# Patient Record
Sex: MALE | Race: BLACK OR AFRICAN AMERICAN | Employment: FULL TIME | URBAN - METROPOLITAN AREA
[De-identification: names, ages, dates, MRNs, and addresses within clinical notes are randomized per-mention and may not be internally consistent; named-entity substitution may affect disease eponyms.]

---

## 2020-04-17 ENCOUNTER — APPOINTMENT (OUTPATIENT)
Dept: CT IMAGING | Age: 65
DRG: 101 | End: 2020-04-17
Attending: EMERGENCY MEDICINE
Payer: COMMERCIAL

## 2020-04-17 ENCOUNTER — HOSPITAL ENCOUNTER (INPATIENT)
Age: 65
LOS: 1 days | Discharge: HOME | DRG: 101 | End: 2020-04-19
Attending: EMERGENCY MEDICINE | Admitting: INTERNAL MEDICINE
Payer: COMMERCIAL

## 2020-04-17 DIAGNOSIS — R56.9 NEW ONSET SEIZURE (HCC): Primary | ICD-10-CM

## 2020-04-17 DIAGNOSIS — R56.9 SEIZURE (HCC): ICD-10-CM

## 2020-04-17 DIAGNOSIS — E03.9 ACQUIRED HYPOTHYROIDISM: Chronic | ICD-10-CM

## 2020-04-17 DIAGNOSIS — I10 ESSENTIAL HYPERTENSION: Chronic | ICD-10-CM

## 2020-04-17 DIAGNOSIS — G40.209 COMPLEX PARTIAL SEIZURE EVOLVING TO GENERALIZED SEIZURE (HCC): ICD-10-CM

## 2020-04-17 LAB
ALBUMIN SERPL-MCNC: 3.8 G/DL (ref 3.2–4.6)
ALBUMIN/GLOB SERPL: 1 {RATIO} (ref 1.2–3.5)
ALP SERPL-CCNC: 85 U/L (ref 50–136)
ALT SERPL-CCNC: 44 U/L (ref 12–65)
ANION GAP SERPL CALC-SCNC: 9 MMOL/L (ref 7–16)
AST SERPL-CCNC: 46 U/L (ref 15–37)
BASOPHILS # BLD: 0 K/UL (ref 0–0.2)
BASOPHILS NFR BLD: 0 % (ref 0–2)
BILIRUB SERPL-MCNC: 0.7 MG/DL (ref 0.2–1.1)
BUN SERPL-MCNC: 18 MG/DL (ref 8–23)
CALCIUM SERPL-MCNC: 8.7 MG/DL (ref 8.3–10.4)
CHLORIDE SERPL-SCNC: 105 MMOL/L (ref 98–107)
CO2 SERPL-SCNC: 27 MMOL/L (ref 21–32)
CREAT SERPL-MCNC: 1.29 MG/DL (ref 0.8–1.5)
DIFFERENTIAL METHOD BLD: ABNORMAL
EOSINOPHIL # BLD: 0.1 K/UL (ref 0–0.8)
EOSINOPHIL NFR BLD: 1 % (ref 0.5–7.8)
ERYTHROCYTE [DISTWIDTH] IN BLOOD BY AUTOMATED COUNT: 13.7 % (ref 11.9–14.6)
GLOBULIN SER CALC-MCNC: 3.9 G/DL (ref 2.3–3.5)
GLUCOSE SERPL-MCNC: 157 MG/DL (ref 65–100)
HCT VFR BLD AUTO: 45.1 % (ref 41.1–50.3)
HGB BLD-MCNC: 14.3 G/DL (ref 13.6–17.2)
IMM GRANULOCYTES # BLD AUTO: 0 K/UL (ref 0–0.5)
IMM GRANULOCYTES NFR BLD AUTO: 0 % (ref 0–5)
LYMPHOCYTES # BLD: 1.2 K/UL (ref 0.5–4.6)
LYMPHOCYTES NFR BLD: 18 % (ref 13–44)
MAGNESIUM SERPL-MCNC: 2.2 MG/DL (ref 1.8–2.4)
MCH RBC QN AUTO: 26 PG (ref 26.1–32.9)
MCHC RBC AUTO-ENTMCNC: 31.7 G/DL (ref 31.4–35)
MCV RBC AUTO: 82 FL (ref 79.6–97.8)
MONOCYTES # BLD: 0.3 K/UL (ref 0.1–1.3)
MONOCYTES NFR BLD: 5 % (ref 4–12)
NEUTS SEG # BLD: 4.9 K/UL (ref 1.7–8.2)
NEUTS SEG NFR BLD: 75 % (ref 43–78)
NRBC # BLD: 0 K/UL (ref 0–0.2)
PLATELET # BLD AUTO: 189 K/UL (ref 150–450)
PMV BLD AUTO: 11.4 FL (ref 9.4–12.3)
POTASSIUM SERPL-SCNC: 4.3 MMOL/L (ref 3.5–5.1)
PROT SERPL-MCNC: 7.7 G/DL (ref 6.3–8.2)
RBC # BLD AUTO: 5.5 M/UL (ref 4.23–5.6)
SODIUM SERPL-SCNC: 141 MMOL/L (ref 136–145)
WBC # BLD AUTO: 6.5 K/UL (ref 4.3–11.1)

## 2020-04-17 PROCEDURE — 99285 EMERGENCY DEPT VISIT HI MDM: CPT

## 2020-04-17 PROCEDURE — 80053 COMPREHEN METABOLIC PANEL: CPT

## 2020-04-17 PROCEDURE — 70450 CT HEAD/BRAIN W/O DYE: CPT

## 2020-04-17 PROCEDURE — 74011250636 HC RX REV CODE- 250/636: Performed by: EMERGENCY MEDICINE

## 2020-04-17 PROCEDURE — 83735 ASSAY OF MAGNESIUM: CPT

## 2020-04-17 PROCEDURE — 96365 THER/PROPH/DIAG IV INF INIT: CPT

## 2020-04-17 PROCEDURE — 93005 ELECTROCARDIOGRAM TRACING: CPT | Performed by: INTERNAL MEDICINE

## 2020-04-17 PROCEDURE — 74011000258 HC RX REV CODE- 258: Performed by: EMERGENCY MEDICINE

## 2020-04-17 PROCEDURE — 85025 COMPLETE CBC W/AUTO DIFF WBC: CPT

## 2020-04-17 RX ADMIN — LEVETIRACETAM 1000 MG: 500 INJECTION, SOLUTION INTRAVENOUS at 22:45

## 2020-04-18 ENCOUNTER — APPOINTMENT (OUTPATIENT)
Dept: MRI IMAGING | Age: 65
DRG: 101 | End: 2020-04-18
Attending: NURSE PRACTITIONER
Payer: COMMERCIAL

## 2020-04-18 PROBLEM — E03.9 ACQUIRED HYPOTHYROIDISM: Chronic | Status: ACTIVE | Noted: 2020-04-18

## 2020-04-18 PROBLEM — R56.9 SEIZURE (HCC): Status: ACTIVE | Noted: 2020-04-18

## 2020-04-18 PROBLEM — I10 HYPERTENSION: Chronic | Status: ACTIVE | Noted: 2020-04-18

## 2020-04-18 PROBLEM — E78.5 HYPERLIPIDEMIA: Chronic | Status: ACTIVE | Noted: 2020-04-18

## 2020-04-18 LAB
AMPHET UR QL SCN: NEGATIVE
ATRIAL RATE: 89 BPM
BARBITURATES UR QL SCN: NEGATIVE
BENZODIAZ UR QL: POSITIVE
CALCULATED P AXIS, ECG09: 63 DEGREES
CALCULATED R AXIS, ECG10: -24 DEGREES
CALCULATED T AXIS, ECG11: 65 DEGREES
CANNABINOIDS UR QL SCN: NEGATIVE
COCAINE UR QL SCN: NEGATIVE
DIAGNOSIS, 93000: NORMAL
METHADONE UR QL: NEGATIVE
OPIATES UR QL: NEGATIVE
P-R INTERVAL, ECG05: 142 MS
PCP UR QL: NEGATIVE
Q-T INTERVAL, ECG07: 332 MS
QRS DURATION, ECG06: 78 MS
QTC CALCULATION (BEZET), ECG08: 399 MS
VENTRICULAR RATE, ECG03: 87 BPM

## 2020-04-18 PROCEDURE — 74011250636 HC RX REV CODE- 250/636: Performed by: INTERNAL MEDICINE

## 2020-04-18 PROCEDURE — A9575 INJ GADOTERATE MEGLUMI 0.1ML: HCPCS | Performed by: INTERNAL MEDICINE

## 2020-04-18 PROCEDURE — 65270000029 HC RM PRIVATE

## 2020-04-18 PROCEDURE — 80307 DRUG TEST PRSMV CHEM ANLYZR: CPT

## 2020-04-18 PROCEDURE — 99232 SBSQ HOSP IP/OBS MODERATE 35: CPT | Performed by: NURSE PRACTITIONER

## 2020-04-18 PROCEDURE — 74011250637 HC RX REV CODE- 250/637: Performed by: INTERNAL MEDICINE

## 2020-04-18 PROCEDURE — 70553 MRI BRAIN STEM W/O & W/DYE: CPT

## 2020-04-18 RX ORDER — ROSUVASTATIN CALCIUM 20 MG/1
40 TABLET, COATED ORAL
Status: DISCONTINUED | OUTPATIENT
Start: 2020-04-18 | End: 2020-04-19 | Stop reason: HOSPADM

## 2020-04-18 RX ORDER — NALOXONE HYDROCHLORIDE 0.4 MG/ML
0.4 INJECTION, SOLUTION INTRAMUSCULAR; INTRAVENOUS; SUBCUTANEOUS AS NEEDED
Status: DISCONTINUED | OUTPATIENT
Start: 2020-04-18 | End: 2020-04-19 | Stop reason: HOSPADM

## 2020-04-18 RX ORDER — HYDRALAZINE HYDROCHLORIDE 20 MG/ML
10 INJECTION INTRAMUSCULAR; INTRAVENOUS
Status: DISCONTINUED | OUTPATIENT
Start: 2020-04-18 | End: 2020-04-19 | Stop reason: HOSPADM

## 2020-04-18 RX ORDER — SODIUM CHLORIDE 0.9 % (FLUSH) 0.9 %
10 SYRINGE (ML) INJECTION
Status: COMPLETED | OUTPATIENT
Start: 2020-04-18 | End: 2020-04-18

## 2020-04-18 RX ORDER — SODIUM CHLORIDE 0.9 % (FLUSH) 0.9 %
5-40 SYRINGE (ML) INJECTION AS NEEDED
Status: DISCONTINUED | OUTPATIENT
Start: 2020-04-18 | End: 2020-04-19 | Stop reason: HOSPADM

## 2020-04-18 RX ORDER — ONDANSETRON 2 MG/ML
4 INJECTION INTRAMUSCULAR; INTRAVENOUS
Status: DISCONTINUED | OUTPATIENT
Start: 2020-04-18 | End: 2020-04-19 | Stop reason: HOSPADM

## 2020-04-18 RX ORDER — LEVOTHYROXINE SODIUM 50 UG/1
50 TABLET ORAL
Status: DISCONTINUED | OUTPATIENT
Start: 2020-04-18 | End: 2020-04-19 | Stop reason: HOSPADM

## 2020-04-18 RX ORDER — LEVETIRACETAM 500 MG/1
1000 TABLET ORAL EVERY 12 HOURS
Status: DISCONTINUED | OUTPATIENT
Start: 2020-04-18 | End: 2020-04-19 | Stop reason: HOSPADM

## 2020-04-18 RX ORDER — ACETAMINOPHEN 325 MG/1
650 TABLET ORAL
Status: DISCONTINUED | OUTPATIENT
Start: 2020-04-18 | End: 2020-04-19 | Stop reason: HOSPADM

## 2020-04-18 RX ORDER — AMLODIPINE BESYLATE 10 MG/1
10 TABLET ORAL DAILY
Status: DISCONTINUED | OUTPATIENT
Start: 2020-04-18 | End: 2020-04-19 | Stop reason: HOSPADM

## 2020-04-18 RX ORDER — SODIUM CHLORIDE 0.9 % (FLUSH) 0.9 %
5-40 SYRINGE (ML) INJECTION EVERY 8 HOURS
Status: DISCONTINUED | OUTPATIENT
Start: 2020-04-18 | End: 2020-04-19 | Stop reason: HOSPADM

## 2020-04-18 RX ORDER — LORAZEPAM 2 MG/ML
1 INJECTION INTRAMUSCULAR
Status: DISCONTINUED | OUTPATIENT
Start: 2020-04-18 | End: 2020-04-19 | Stop reason: HOSPADM

## 2020-04-18 RX ORDER — HYDROCODONE BITARTRATE AND ACETAMINOPHEN 5; 325 MG/1; MG/1
1 TABLET ORAL
Status: DISCONTINUED | OUTPATIENT
Start: 2020-04-18 | End: 2020-04-19 | Stop reason: HOSPADM

## 2020-04-18 RX ORDER — GADOTERATE MEGLUMINE 376.9 MG/ML
19 INJECTION INTRAVENOUS
Status: COMPLETED | OUTPATIENT
Start: 2020-04-18 | End: 2020-04-18

## 2020-04-18 RX ADMIN — Medication 10 ML: at 17:46

## 2020-04-18 RX ADMIN — Medication 5 ML: at 06:22

## 2020-04-18 RX ADMIN — ACETAMINOPHEN 650 MG: 325 TABLET, FILM COATED ORAL at 10:04

## 2020-04-18 RX ADMIN — ROSUVASTATIN CALCIUM 40 MG: 20 TABLET, COATED ORAL at 20:28

## 2020-04-18 RX ADMIN — LEVETIRACETAM 1000 MG: 500 TABLET ORAL at 20:28

## 2020-04-18 RX ADMIN — GADOTERATE MEGLUMINE 19 ML: 376.9 INJECTION INTRAVENOUS at 11:14

## 2020-04-18 RX ADMIN — LEVOTHYROXINE SODIUM 50 MCG: 0.05 TABLET ORAL at 06:22

## 2020-04-18 RX ADMIN — Medication 10 ML: at 11:13

## 2020-04-18 RX ADMIN — ROSUVASTATIN CALCIUM 40 MG: 20 TABLET, COATED ORAL at 03:38

## 2020-04-18 RX ADMIN — LEVETIRACETAM 1000 MG: 500 TABLET ORAL at 10:02

## 2020-04-18 RX ADMIN — Medication 10 ML: at 20:28

## 2020-04-18 RX ADMIN — AMLODIPINE BESYLATE 10 MG: 10 TABLET ORAL at 10:04

## 2020-04-18 RX ADMIN — Medication 5 ML: at 03:55

## 2020-04-18 NOTE — PROGRESS NOTES
04/18/20 0300   Dual Skin Pressure Injury Assessment   Dual Skin Pressure Injury Assessment X   Second Care Provider (Based on [address] [place] [address]) Jenna James RN    Skin Integumentary   Skin Integumentary (WDL) X   Skin Color Appropriate for ethnicity   Skin Condition/Temp Warm;Dry   Skin Integrity Other (comment); Abrasion  (small abrasion above ear )   Turgor Non-tenting   Hair Growth Present   Varicosities Absent   Wound Prevention and Protection Methods   Orientation of Wound Prevention Posterior   Location of Wound Prevention Sacrum/Coccyx   Dressing Present  No   Wound Offloading (Prevention Methods) Bed, pressure reduction mattress;Pillows

## 2020-04-18 NOTE — CONSULTS
Consult    Patient: Gloria Hay MRN: [id number]     YOB: 1955  Age: 59 y.o. Sex: male      Subjective: Gloria Hay is a 59 y.o. male who is being seen for new onset seizure. PMH significant for HTN, HLP, hypothyroidism. Patient is poor historian. Stated he recalls eating dinner with wife, and then next thing he remembers being in the ED. Spoke with his wife, [facility] by telephone. His wife states that they were eating dinner and she noted that he began to stare off into space with facial twitching, then started to experience spontaneous rhythmic twitching of bilateral feet. Described his as being \"straight as an arrow\". She lowered him to the floor, and he then began to snore. Lasted approximately 1-2 minutes. Upon awakening, he was combative and disoriented. Denies loss of bowel or bladder. Patient denies history seizure, ETOH use, trauma, or recent illness. He received Versed by EMS prior to arrival to ED. He was noted to be more alert on arrival. Laceration noted to left side of tongue. Keppra 1000 mg IV was administered. CT head negative. No past medical history on file. No past surgical history on file. No family history on file.   Social History     Tobacco Use    Smoking status: Never Smoker   Substance Use Topics    Alcohol use: Never     Frequency: Never      Current Facility-Administered Medications   Medication Dose Route Frequency Provider Last Rate Last Dose    sodium chloride (NS) flush 5-40 mL  5-40 mL IntraVENous Q8H Alanis Alvarez MD   5 mL at 04/18/20 0622    sodium chloride (NS) flush 5-40 mL  5-40 mL IntraVENous PRN Roby Alvarez MD        acetaminophen (TYLENOL) tablet 650 mg  650 mg Oral Q6H PRN Roby Alvarez MD   650 mg at 04/18/20 1004    HYDROcodone-acetaminophen (NORCO) 5-325 mg per tablet 1 Tab  1 Tab Oral Q4H PRN Roby Alvarez MD        naloxone (NARCAN) injection 0.4 mg  0.4 mg IntraVENous PRN Nitesh Shea MD        ondansetron [facility]) injection 4 mg  4 mg IntraVENous Q4H PRN Elvia Alvarez MD        amLODIPine (NORVASC) tablet 10 mg  10 mg Oral DAILY Elvia Alvarez MD   10 mg at 04/18/20 1004    levothyroxine (SYNTHROID) tablet 50 mcg  50 mcg Oral ACB Elvia Alvarez MD   50 mcg at 04/18/20 4198    rosuvastatin (CRESTOR) tablet 40 mg  40 mg Oral QHS Elvia Alvarez MD   40 mg at 04/18/20 0562    hydrALAZINE (APRESOLINE) 20 mg/mL injection 10 mg  10 mg IntraVENous Q6H PRN Elvia Alvarez MD        LORazepam (ATIVAN) injection 1 mg  1 mg IntraVENous Q4H PRN Elvia Alvarez MD        levETIRAcetam (KEPPRA) tablet 1,000 mg  1,000 mg Oral Q12H Nitesh Shea MD   1,000 mg at 04/18/20 1002        No Known Allergies    Review of Systems:  CONSTITUTIONAL:  Denies weight loss, fever, chills, weakness or fatigue. HEENT:  Eyes:  Denies visual loss, blurred vision, double vision or yellow sclerae. Ears, Nose, Throat:  Denies hearing loss, sneezing, congestion, runny nose or sore throat. SKIN:  Denies rash or itching. CARDIOVASCULAR:  Denies chest pain, chest pressure or chest discomfort. No palpitations or edema. RESPIRATORY:  Denies shortness of breath, cough or sputum. GASTROINTESTINAL:  Denies anorexia, nausea, vomiting or diarrhea. No abdominal pain or blood. GENITOURINARY:  Denies burning with urination. NEUROLOGICAL:  Denies headache, dizziness, syncope, paralysis, ataxia, numbness or tingling in the extremities. Denies change in bowel or bladder control. MUSCULOSKELETAL:  Denies muscle, back pain, joint pain or stiffness. HEMATOLOGIC:  Denies anemia, bleeding or bruising. LYMPHATICS:  Denies enlarged nodes. PSYCHIATRIC: Denies history depression or anxiety. ENDOCRINOLOGIC:  Denies reports of sweating, cold or heat intolerance. No polyuria or polydipsia.   ALLERGIES:  Denies history of asthma, hives, eczema or rhinitis. Objective:     Vitals:    04/18/20 0240 04/18/20 0310 04/18/20 0748 04/18/20 1223   BP: 131/61 149/81 158/90 148/69   Pulse: 65 63 62 63   Resp: 12 14 16 18   Temp:  98.2 °F (36.8 °C) 97.5 °F (36.4 °C) 98 °F (36.7 °C)   SpO2: 100% 98% 99% 100%   Weight:       Height:            Physical Exam:  General - Well developed, well nourished, in no apparent distress. Pleasant and conversent. HEENT - Normocephalic, atraumatic. Conjunctiva, tympanic membranes, and oropharynx are clear. Tongue laceration on left side. Neck - Supple without masses, no bruits   Cardiovascular - Regular rate and rhythm. Normal S1, S2 without murmurs, rubs, or gallops. Lungs - Clear to auscultation. Abdomen - Soft, nontender with normal bowel sounds. Extremities - Peripheral pulses intact. No edema and no rashes. Neurological examination - Comprehension, attention, memory and reasoning are intact. Language and speech are dysarthric due to tongue laceration. On cranial nerve examination, (II, III, IV, VI) pupils are equal, round, and reactive to light. Visual acuity is adequate. Visual fields are full to finger confrontation. Extraocular motility is normal. (V, VII) Face is symmetric and sensation is intact to light touch. (VIII) Hearing is intact. (IX, X) Palate and uvula elevate symmetrically. Voice is normal. (XI) Shoulder shrug is strong and equal bilaterally. (XII)Tongue is midline. Motor examination - There is normal muscle tone and bulk. Power is full throughout. Muscle stretch reflexes are normoactive and there are no pathological reflexes present. Plantar response is flexor. Sensation is intact to light touch, pinprick, vibration and proprioception in all extremities.  Cerebellar examination is normal. Gait and stance are normal.         No results found for: CHOL, CHOLPOCT, CHOLX, CHLST, CHOLV, HDL, HDLPOC, HDLP, LDL, LDLCPOC, LDLC, DLDLP, VLDLC, VLDL, TGLX, TRIGL, TRIGP, TGLPOCT, CHHD, CHHDX     No results found for: HBA1C, HGBE8, UXO7MLGA, MXX7YOJH     CT Results (most recent):  Results from [place] encounter on 04/17/20   CT HEAD WO CONT    Narrative EXAM: Noncontrast CT head. INDICATION: New onset seizure. COMPARISON: None. TECHNIQUE: Noncontrast CT images of the head were obtained. Radiation dose  reduction techniques were used for this study. Our CT scanners use one or all  of the following:  Automated exposure control, adjustment of the mA or kV  according to patient size, iterative reconstruction. FINDINGS: Brain volume is appropriate for age. No acute infarct, hemorrhage or  mass is identified. There is no mass effect, midline shift or depressed  fracture. The visualized paranasal sinuses and mastoid air cells are clear. Impression IMPRESSION: No acute process. Results for orders placed or performed during the hospital encounter of 04/17/20   EKG, 12 LEAD, INITIAL   Result Value Ref Range    Ventricular Rate 87 BPM    Atrial Rate 89 BPM    P-R Interval 142 ms    QRS Duration 78 ms    Q-T Interval 332 ms    QTC Calculation (Bezet) 399 ms    Calculated P Axis 63 degrees    Calculated R Axis -24 degrees    Calculated T Axis 65 degrees    Diagnosis       !! AGE AND GENDER SPECIFIC ECG ANALYSIS !! Normal sinus rhythm  Normal ECG  No previous ECGs available          MRI Results (most recent):   No results found for this or any previous visit. Most recent CTA:      Most recent MRA:  No results found for this or any previous visit. Most recent Echo:  No results found for this visit on 04/17/20. Assessment:     59 y.o. male who is being seen for new onset seizure, likely  focal to generalized seizure that resulted in laceration to tongue. Will obtain EEG and MRI of brain. Cotinue Keppra 1000mg IV po BID. Plan:     · Continue Keppra 1000 mg IV BID.     · Brain MRI without contrast with thin coronal cuts (seizure protocol)   · Laboratory studies looking for provoking factors   · Routine 30 minute EEG   · Ativan 0.1 mg/kg IV (max dose: 4 mg) for seizure activity over 3-5 minutes. If seizure continues after additional 3-5 minutes then repeat same dose and call neurology. If the patient does not have IV access then give IM midazolam 10 mg as a single dose. Seizure Precautions - discussed with patient    Please obey the following seizure precautions:  1. No driving until seizure free for 6 months, or cleared by a neurologist.  2. No tub baths, take showers instead. 3. No swimming unless there is someone immediately next to you  4. No working at heights, on roofs, climbing ladders, etc.  5. Use the back burns when cooking  6. Avoid open machinery  7. Avoid open flames unless someone is immediately nearby  8. In general avoid situations where someone could be injured in the event of a seizure.       Signed By: Alexandru England NP     April 18, 2020

## 2020-04-18 NOTE — ED PROVIDER NOTES
Mask was worn during the entire patient examination. Abad Au is a 59 y.o. male who presents to the ED with a chief complaint of seizure. Per EMS patient was eating dinner and began to have generalized shaking. His wife helped him to the ground there was no trauma. He was very agitated and combative when EMS arrived and did require 5 mg of Versed. He did have blood in his mouth due to biting his tongue. Patient has no history of seizures and denies any headaches. He has no focal weakness. No past medical history on file. No past surgical history on file. No family history on file.     Social History     Socioeconomic History    Marital status:      Spouse name: Not on file    Number of children: Not on file    Years of education: Not on file    Highest education level: Not on file   Occupational History    Not on file   Social Needs    Financial resource strain: Not on file    Food insecurity     Worry: Not on file     Inability: Not on file    Transportation needs     Medical: Not on file     Non-medical: Not on file   Tobacco Use    Smoking status: Not on file   Substance and Sexual Activity    Alcohol use: Not on file    Drug use: Not on file    Sexual activity: Not on file   Lifestyle    Physical activity     Days per week: Not on file     Minutes per session: Not on file    Stress: Not on file   Relationships    Social connections     Talks on phone: Not on file     Gets together: Not on file     Attends Cheondo[employer] service: Not on file     Active member of club or organization: Not on file     Attends meetings of clubs or organizations: Not on file     Relationship status: Not on file    Intimate partner violence     Fear of current or ex partner: Not on file     Emotionally abused: Not on file     Physically abused: Not on file     Forced sexual activity: Not on file   Other Topics Concern    Not on file   Social History Narrative    Not on file ALLERGIES: Patient has no allergy information on record. Review of Systems   Constitutional: Negative for chills and fever. Respiratory: Negative for chest tightness and shortness of breath. Cardiovascular: Negative for chest pain and palpitations. Gastrointestinal: Negative for abdominal pain, diarrhea, nausea and vomiting. Skin: Negative for pallor and rash. Neurological: Positive for seizures. Psychiatric/Behavioral: Positive for agitation. All other systems reviewed and are negative. There were no vitals filed for this visit. Physical Exam  Vitals signs and nursing note reviewed. Constitutional:       General: He is not in acute distress. Appearance: Normal appearance. He is well-developed. He is not ill-appearing, toxic-appearing or diaphoretic. HENT:      Head: Normocephalic and atraumatic. Nose: Nose normal. No congestion or rhinorrhea. Mouth/Throat:      Comments: The mid left side of the tongue has a bite wound present. Eyes:      General: No scleral icterus. Conjunctiva/sclera: Conjunctivae normal.   Neck:      Musculoskeletal: Normal range of motion. No neck rigidity or muscular tenderness. Trachea: No tracheal deviation. Cardiovascular:      Rate and Rhythm: Normal rate and regular rhythm. Pulmonary:      Effort: Pulmonary effort is normal. No respiratory distress. Breath sounds: No stridor. No wheezing, rhonchi or rales. Chest:      Chest wall: No tenderness. Abdominal:      General: Abdomen is flat. Tenderness: There is no abdominal tenderness. There is no guarding or rebound. Hernia: No hernia is present. Lymphadenopathy:      Cervical: No cervical adenopathy. Neurological:      General: No focal deficit present. Mental Status: He is alert and oriented to person, place, and time. Mental status is at baseline. Cranial Nerves: No cranial nerve deficit. Sensory: No sensory deficit.       Motor: No weakness. Coordination: Coordination normal.   Psychiatric:         Mood and Affect: Mood normal.         Behavior: Behavior normal.         Thought Content: Thought content normal.          MDM  Number of Diagnoses or Management Options  Seizure [facility]):   Diagnosis management comments: Patient has no further seizure. CT scan labs are normal given that he is never had a seizure before and having hospitalist admit him for further evaluation and observation. I have spoken with his daughter and wife on the phone. Stephenie Cooper MD; 4/18/2020 @12:44 AM Voice dictation software was used during the making of this note. This software is not perfect and grammatical and other typographical errors may be present.   This note has not been proofread for errors.  ===================================================================          Amount and/or Complexity of Data Reviewed  Clinical lab tests: ordered and reviewed (Results for orders placed or performed during the hospital encounter of 04/17/20  -CBC WITH AUTOMATED DIFF       Result                      Value             Ref Range           WBC                         6.5               4.3 - 11.1 K*       RBC                         5.50              4.23 - 5.6 M*       HGB                         14.3              13.6 - 17.2 *       HCT                         45.1              41.1 - 50.3 %       MCV                         82.0              79.6 - 97.8 *       MCH                         26.0 (L)          26.1 - 32.9 *       MCHC                        31.7              31.4 - 35.0 *       RDW                         13.7              11.9 - 14.6 %       PLATELET                    189               150 - 450 K/*       MPV                         11.4              9.4 - 12.3 FL       ABSOLUTE NRBC               0.00              0.0 - 0.2 K/*       DF                          AUTOMATED                             NEUTROPHILS                 75 43 - 78 %           LYMPHOCYTES                 18                13 - 44 %           MONOCYTES                   5                 4.0 - 12.0 %        EOSINOPHILS                 1                 0.5 - 7.8 %         BASOPHILS                   0                 0.0 - 2.0 %         IMMATURE GRANULOCYTES       0                 0.0 - 5.0 %         ABS. NEUTROPHILS            4.9               1.7 - 8.2 K/*       ABS. LYMPHOCYTES            1.2               0.5 - 4.6 K/*       ABS. MONOCYTES              0.3               0.1 - 1.3 K/*       ABS. EOSINOPHILS            0.1               0.0 - 0.8 K/*       ABS. BASOPHILS              0.0               0.0 - 0.2 K/*       ABS. IMM. GRANS.            0.0               0.0 - 0.5 K/*  -METABOLIC PANEL, COMPREHENSIVE       Result                      Value             Ref Range           Sodium                      141               136 - 145 mm*       Potassium                   4.3               3.5 - 5.1 mm*       Chloride                    105               98 - 107 mmo*       CO2                         27                21 - 32 mmol*       Anion gap                   9                 7 - 16 mmol/L       Glucose                     157 (H)           65 - 100 mg/*       BUN                         18                8 - 23 MG/DL        Creatinine                  1.29              0.8 - 1.5 MG*       GFR est AA                  >60               >60 ml/min/1*       GFR est non-AA              60 (L)            >60 ml/min/1*       Calcium                     8.7               8.3 - 10.4 M*       Bilirubin, total            0.7               0.2 - 1.1 MG*       ALT (SGPT)                  44                12 - 65 U/L         AST (SGOT)                  46 (H)            15 - 37 U/L         Alk.  phosphatase            85                50 - 136 U/L        Protein, total              7.7               6.3 - 8.2 g/*       Albumin                     3.8 3.2 - 4.6 g/*       Globulin                    3.9 (H)           2.3 - 3.5 g/*       A-G Ratio                   1.0 (L)           1.2 - 3.5      -MAGNESIUM       Result                      Value             Ref Range           Magnesium                   2.2               1.8 - 2.4 mg* )  Tests in the radiology section of CPT®: ordered and reviewed (Ct Head Wo Cont    Result Date: 4/18/2020  EXAM: Noncontrast CT head. INDICATION: New onset seizure. COMPARISON: None. TECHNIQUE: Noncontrast CT images of the head were obtained. Radiation dose reduction techniques were used for this study. Our CT scanners use one or all of the following:  Automated exposure control, adjustment of the mA or kV according to patient size, iterative reconstruction. FINDINGS: Brain volume is appropriate for age. No acute infarct, hemorrhage or mass is identified. There is no mass effect, midline shift or depressed fracture. The visualized paranasal sinuses and mastoid air cells are clear.      IMPRESSION: No acute process.   )  Discuss the patient with other providers: yes           Procedures

## 2020-04-18 NOTE — ED TRIAGE NOTES
EMS: Pt arriving from home via [facility]. Called out shaking. Wife helped pt from chair to the ground. Pt post ictal and combative with EMS. Pt given 5mg Versed en route. Pt place in soft restraints until Versed kicked in. No hx of seizures. PT has HTN and hypothyroidism and , 93 Hr, 98% on 2L, 14 RR, 138/87. No access in place on arrival.    Pt drowsy but cooperative during triage. Pt able to purposely move all extremities. Speech mildly slurred. PT only complaint is pain to tongue. Pt has blood on shirt and face, presumably from biting tongue.

## 2020-04-18 NOTE — H&P
Hospitalist H&P Note     Admit Date:  2020 10:10 PM   Name:  Slade Casanova   Age:  59 y.o.  :  1955   MRN:  [id number]   PCP:  Nicol Gallegos MD  Treatment Team: Attending Provider: Shweta Garcia MD; Primary Nurse: Harsh Carranza RN    HPI:     CC: Seizure       Mr. Irasema Holman is a 60 yo male with PMH of HTN, HLP, hypothyroidism who is evaluated in the ED with seizure. I discussed the case with his wife Sherrill via phone. She states that they were eating dinner and she noted that Mr. Irasema Holman began to stare off into space, then have spontaneous extremity movements. She lowered him to the floor and he then began to snore. Upon awakening, he was combative. She did not notice that he had loss of bowel or bladder control. She states that he is otherwise well, no ETOH use, no recent confusion. He received versed per EMS but is now more alert in the ED. Speech somewhat slurred and he did bite his tongue that is now swollen somewhat. He received keppra 1 gram IV. CT head negative. 10 systems reviewed and negative except as noted in HPI. - no dyspnea, chest pain, anorexia or bowel changes        past medical history- HTN, HLP, hypothyroidism  No past surgical history  No Known Allergies   Social History     Tobacco Use    Smoking status: Never Smoker   Substance Use Topics    Alcohol use: Never     Frequency: Never       family history -denies seizures     There is no immunization history on file for this patient.   PTA Medications:  None       Objective:     Patient Vitals for the past 24 hrs:   Temp Pulse Resp BP SpO2   20 0021  82 17 177/74 98 %   20 2346  81 13 151/77    20 2316  76 16 147/69 95 %   20 2301  85 18 149/75 97 %   20 2245  85 12 147/77 94 %   20 2230  92 21 147/77 96 %   20 2224 98.2 °F (36.8 °C) 87 16 144/76 94 %     Oxygen Therapy  O2 Sat (%): 98 % (20 0021)  Pulse via Oximetry: 83 beats per minute (20 0021)  O2 Device: Room air (04/17/20 2324)    Intake/Output Summary (Last 24 hours) at 4/18/2020 0101  Last data filed at 4/17/2020 2313  Gross per 24 hour   Intake 100 ml   Output    Net 100 ml       Physical Exam:  General:    Alert. No distress  Eyes:   Normal sclera. Extraocular movements intact. PERRLA  ENT:  Normocephalic, Moist mucous membranes, tongue laceration left lateral tongue  CV:   RRR. No m/r/g. No edema  Lungs:  CTAB. No wheezing, rhonchi, or rales. anterior  Abdomen: Soft, nontender, nondistended. Present BS  Extremities: Warm and dry. .  Neurologic:  grossly intact. A and O x 3, speech slurred likely due to tongue laceration, 5/5 extremity strength  Skin:     No rashes or jaundice. Normal coloration  Psych:  Normal mood and affect. I reviewed the labs, imaging, EKGs, telemetry, and other studies done this admission. Data Review:   Recent Results (from the past 24 hour(s))   CBC WITH AUTOMATED DIFF    Collection Time: 04/17/20 10:30 PM   Result Value Ref Range    WBC 6.5 4.3 - 11.1 K/uL    RBC 5.50 4.23 - 5.6 M/uL    HGB 14.3 13.6 - 17.2 g/dL    HCT 45.1 41.1 - 50.3 %    MCV 82.0 79.6 - 97.8 FL    MCH 26.0 (L) 26.1 - 32.9 PG    MCHC 31.7 31.4 - 35.0 g/dL    RDW 13.7 11.9 - 14.6 %    PLATELET 141 172 - 895 K/uL    MPV 11.4 9.4 - 12.3 FL    ABSOLUTE NRBC 0.00 0.0 - 0.2 K/uL    DF AUTOMATED      NEUTROPHILS 75 43 - 78 %    LYMPHOCYTES 18 13 - 44 %    MONOCYTES 5 4.0 - 12.0 %    EOSINOPHILS 1 0.5 - 7.8 %    BASOPHILS 0 0.0 - 2.0 %    IMMATURE GRANULOCYTES 0 0.0 - 5.0 %    ABS. NEUTROPHILS 4.9 1.7 - 8.2 K/UL    ABS. LYMPHOCYTES 1.2 0.5 - 4.6 K/UL    ABS. MONOCYTES 0.3 0.1 - 1.3 K/UL    ABS. EOSINOPHILS 0.1 0.0 - 0.8 K/UL    ABS. BASOPHILS 0.0 0.0 - 0.2 K/UL    ABS. IMM.  GRANS. 0.0 0.0 - 0.5 K/UL   METABOLIC PANEL, COMPREHENSIVE    Collection Time: 04/17/20 10:30 PM   Result Value Ref Range    Sodium 141 136 - 145 mmol/L    Potassium 4.3 3.5 - 5.1 mmol/L    Chloride 105 98 - 107 mmol/L CO2 27 21 - 32 mmol/L    Anion gap 9 7 - 16 mmol/L    Glucose 157 (H) 65 - 100 mg/dL    BUN 18 8 - 23 MG/DL    Creatinine 1.29 0.8 - 1.5 MG/DL    GFR est AA >60 >60 ml/min/1.73m2    GFR est non-AA 60 (L) >60 ml/min/1.73m2    Calcium 8.7 8.3 - 10.4 MG/DL    Bilirubin, total 0.7 0.2 - 1.1 MG/DL    ALT (SGPT) 44 12 - 65 U/L    AST (SGOT) 46 (H) 15 - 37 U/L    Alk. phosphatase 85 50 - 136 U/L    Protein, total 7.7 6.3 - 8.2 g/dL    Albumin 3.8 3.2 - 4.6 g/dL    Globulin 3.9 (H) 2.3 - 3.5 g/dL    A-G Ratio 1.0 (L) 1.2 - 3.5     MAGNESIUM    Collection Time: 04/17/20 10:30 PM   Result Value Ref Range    Magnesium 2.2 1.8 - 2.4 mg/dL       All Micro Results     None          Other Studies:  Ct Head Wo Cont    Result Date: 4/18/2020  EXAM: Noncontrast CT head. INDICATION: New onset seizure. COMPARISON: None. TECHNIQUE: Noncontrast CT images of the head were obtained. Radiation dose reduction techniques were used for this study. Our CT scanners use one or all of the following:  Automated exposure control, adjustment of the mA or kV according to patient size, iterative reconstruction. FINDINGS: Brain volume is appropriate for age. No acute infarct, hemorrhage or mass is identified. There is no mass effect, midline shift or depressed fracture. The visualized paranasal sinuses and mastoid air cells are clear. IMPRESSION: No acute process.       Assessment and Plan:     Hospital Problems as of 4/18/2020 Never Reviewed          Codes Class Noted - Resolved POA    * (Principal) Seizure ([facility] [address] ICD-10-CM: R56.9  ICD-9-CM: 780.39  4/18/2020 - Present Yes        Hypertension (Chronic) ICD-10-CM: I10  ICD-9-CM: 401.9  4/18/2020 - Present Yes        Hyperlipidemia (Chronic) ICD-10-CM: E78.5  ICD-9-CM: 272.4  4/18/2020 - Present Yes        Acquired hypothyroidism (Chronic) ICD-10-CM: E03.9  ICD-9-CM: 244.9  4/18/2020 - Present Yes              · Seizure:  · Admit to medical bed   · clear liquids  · Seizure precautions · EEG  · Neurology consult   · Continue IV keppra 1 gram every 12 hours   · No driving/tub baths/ swimming for 6 months or until cleared per neurology  · Check UDS      · HTN:  · Resume norvasc  · Prn hydralazine        · HLP:  · Resume crestor      · Hypothyroidism:  · Resume synthroid       Discharge planning:  Pending to home  DVT ppx: SCD  Code status:  Full  Estimated LOS:  Greater than 2 midnights  Risk:  high  Care plan: Carlos Aurelia, wife, [phone]  Signed:  Anay Shelley MD

## 2020-04-18 NOTE — PROGRESS NOTES
Reviewed notes prior to visit for spiritual concerns  Will follow up when patient is available    staff Андрей

## 2020-04-18 NOTE — ED NOTES
TRANSFER - OUT REPORT:    Verbal report given to RN on Jonelle Almodovar  being transferred to [id number] for routine progression of care       Report consisted of patients Situation, Background, Assessment and   Recommendations(SBAR). Information from the following report(s) SBAR, ED Summary and MAR was reviewed with the receiving nurse. Lines:   Peripheral IV 04/17/20 Right Antecubital (Active)        Opportunity for questions and clarification was provided.       Patient transported with:   [employer]

## 2020-04-19 VITALS
DIASTOLIC BLOOD PRESSURE: 75 MMHG | OXYGEN SATURATION: 99 % | SYSTOLIC BLOOD PRESSURE: 124 MMHG | RESPIRATION RATE: 17 BRPM | HEART RATE: 56 BPM | WEIGHT: 215 LBS | BODY MASS INDEX: 38.09 KG/M2 | HEIGHT: 63 IN | TEMPERATURE: 97.7 F

## 2020-04-19 PROCEDURE — 95819 EEG AWAKE AND ASLEEP: CPT | Performed by: PSYCHIATRY & NEUROLOGY

## 2020-04-19 PROCEDURE — 99232 SBSQ HOSP IP/OBS MODERATE 35: CPT | Performed by: NURSE PRACTITIONER

## 2020-04-19 PROCEDURE — 74011250637 HC RX REV CODE- 250/637: Performed by: INTERNAL MEDICINE

## 2020-04-19 RX ORDER — AMLODIPINE BESYLATE 10 MG/1
10 TABLET ORAL DAILY
Qty: 30 TAB | Refills: 0 | Status: SHIPPED | OUTPATIENT
Start: 2020-04-20 | End: 2020-05-04 | Stop reason: ALTCHOICE

## 2020-04-19 RX ORDER — ROSUVASTATIN CALCIUM 40 MG/1
40 TABLET, COATED ORAL
Qty: 30 TAB | Refills: 0 | Status: SHIPPED | OUTPATIENT
Start: 2020-04-19

## 2020-04-19 RX ORDER — LEVOTHYROXINE SODIUM 50 UG/1
50 TABLET ORAL
Qty: 30 TAB | Refills: 0 | Status: SHIPPED | OUTPATIENT
Start: 2020-04-19

## 2020-04-19 RX ORDER — LEVETIRACETAM 1000 MG/1
1000 TABLET ORAL EVERY 12 HOURS
Qty: 60 TAB | Refills: 0 | Status: SHIPPED | OUTPATIENT
Start: 2020-04-19 | End: 2020-05-04 | Stop reason: SDUPTHER

## 2020-04-19 RX ADMIN — LEVOTHYROXINE SODIUM 50 MCG: 0.05 TABLET ORAL at 05:31

## 2020-04-19 RX ADMIN — LEVETIRACETAM 1000 MG: 500 TABLET ORAL at 08:13

## 2020-04-19 RX ADMIN — ACETAMINOPHEN 650 MG: 325 TABLET, FILM COATED ORAL at 08:13

## 2020-04-19 RX ADMIN — AMLODIPINE BESYLATE 10 MG: 10 TABLET ORAL at 08:13

## 2020-04-19 RX ADMIN — Medication 10 ML: at 05:32

## 2020-04-19 NOTE — DISCHARGE SUMMARY
Hospitalist Discharge Summary     Patient ID:  Cass Ayala  [id number]  21 y.o.  1955  Admit date: 4/17/2020 10:10 PM  Discharge date and time: 4/19/2020  Attending: Misael Rebolledo DO  PCP:  Malcolm Hung MD  Treatment Team: Attending Provider: Misael Rebolledo DO; Consulting Provider: Gómez Garcia MD; Charge Nurse: Margareth Car    Principal Diagnosis Seizure [facility])   Principal Problem:    Seizure [facility]) (4/18/2020)    Active Problems:    Hypertension (4/18/2020)      Hyperlipidemia (4/18/2020)      Acquired hypothyroidism (4/18/2020)             Hospital Course:  Please refer to the admission H&P for details of presentation. In summary, the patient is 64M with pmhx of HTN, HLP, hypothyroidism who is evaluated in ED with new onset seizure. Was witnessed staring off into space with spontaneous extremity movements without bowel/bladder control and bit his tongue. Denies ETOH use. Pt started on IV keppra and seen by neurology. No further events during hospital course. MRI unremarkable and EEG w/o abnormality. Pt instructed not to drive, operate heavy machinery or take unsupervised bath/swim for 6 months. Significant Diagnostic Studies:   Status Exam Begun  Exam Ended    Final [99] 4/18/2020 10:53 4/18/2020 11:26   Study Result     EXAMINATION: BRAIN MRI 4/18/2020 11:26 AM     ACCESSION NUMBER: [id number]     INDICATION: seizure protocol, seizure     COMPARISON: Head CT 4/17/2020     TECHNIQUE: Multiplanar multisequence MRI of the brain without and with  intravenous administration of 19 cc Dotarem contrast agent.     FINDINGS:     Midline structures including the corpus callosum, pituitary gland, optic nerves,  and cerebellum are well developed.     The ventricles are within normal limits for the mild degree of global brain  parenchymal volume loss. There is no midline shift. The basilar cisterns are  patent.  There is no cerebellar tonsillar ectopia or herniation.     There are a few nonspecific punctate T2 hyperintensities scattered throughout  the periventricular and subcortical white matter.      Diffusion imaging shows no evidence of acute infarction or other acute  abnormality.     The expected large intracranial vascular flow voids are preserved. There is no  evidence of intracranial blood products.     There is no abnormal intra or extra-axial postcontrast enhancement.      The hippocampi demonstrate normal morphology and signal characteristics  bilaterally.      There are no suspicious osseous lesions.     IMPRESSION  IMPRESSION:     1. No evidence of acute infarction.     2. Mild burden of chronic microangiopathy.     VOICE DICTATED BY: Dr. Karen Lira     Final [99] 4/17/2020 23:57 4/18/2020 00:01   Study Result     EXAM: Noncontrast CT head.     INDICATION: New onset seizure.     COMPARISON: None.     TECHNIQUE: Noncontrast CT images of the head were obtained. Radiation dose  reduction techniques were used for this study. Our CT scanners use one or all  of the following:  Automated exposure control, adjustment of the mA or kV  according to patient size, iterative reconstruction.     FINDINGS: Brain volume is appropriate for age. No acute infarct, hemorrhage or  mass is identified. There is no mass effect, midline shift or depressed  fracture. The visualized paranasal sinuses and mastoid air cells are clear.     IMPRESSION  IMPRESSION: No acute process. Labs: Results:       Chemistry Recent Labs     04/17/20  2230   *      K 4.3      CO2 27   BUN 18   CREA 1.29   CA 8.7   AGAP 9   AP 85   TP 7.7   ALB 3.8   GLOB 3.9*   AGRAT 1.0*      CBC w/Diff Recent Labs     04/17/20  2230   WBC 6.5   RBC 5.50   HGB 14.3   HCT 45.1      GRANS 75   LYMPH 18   EOS 1      Cardiac Enzymes No results for input(s): CPK, CKND1, LUIS in the last 72 hours.     No lab exists for component: CKRMB, TROIP   Coagulation No results for input(s): PTP, INR, APTT, INREXT in the last 72 hours. Lipid Panel No results found for: CHOL, CHOLPOCT, CHOLX, CHLST, CHOLV, 911262, HDL, HDLP, LDL, LDLC, DLDLP, 736815, VLDLC, VLDL, TGLX, TRIGL, TRIGP, TGLPOCT, CHHD, CHHDX   BNP No results for input(s): BNPP in the last 72 hours. Liver Enzymes Recent Labs     04/17/20  2230   TP 7.7   ALB 3.8   AP 85   SGOT 46*      Thyroid Studies No results found for: T4, T3U, TSH, TSHEXT         Discharge Exam:  Visit Vitals  /75 (BP 1 Location: Right arm, BP Patient Position: At rest)   Pulse (!) 56   Temp 97.7 °F (36.5 °C)   Resp 17   Ht 5' 3\" (1.6 m)   Wt 97.5 kg (215 lb)   SpO2 99%   BMI 38.09 kg/m²     General appearance: alert, cooperative, no distress, appears stated age  Lungs: clear to auscultation bilaterally  Heart: regular rate and rhythm, S1, S2 normal, no murmur, click, rub or gallop  Abdomen: soft, non-tender. Bowel sounds normal. No masses,  no organomegaly  Extremities: no cyanosis or edema  Neurologic: Grossly normal    Disposition: home  Discharge Condition: stable  Patient Instructions:   Current Discharge Medication List      START taking these medications    Details   amLODIPine (NORVASC) 10 mg tablet Take 1 Tab by mouth daily. Qty: 30 Tab, Refills: 0      levETIRAcetam 1,000 mg tablet Take 1 Tab by mouth every twelve (12) hours. Qty: 60 Tab, Refills: 0      levothyroxine (SYNTHROID) 50 mcg tablet Take 1 Tab by mouth Daily (before breakfast). Qty: 30 Tab, Refills: 0      rosuvastatin (CRESTOR) 40 mg tablet Take 1 Tab by mouth nightly.   Qty: 30 Tab, Refills: 0             Activity: restrictions as above  Diet: cardiac      Follow-up  · PCP in 1 week, Neuro in 2-3 weeks    Time spent to discharge patient 35 minutes  Signed:  Gustavo Grnat DO  4/19/2020  11:51 AM

## 2020-04-19 NOTE — PROGRESS NOTES
Neurology Daily Progress Note     Assessment:     59 y.o. male who is being seen for new onset seizure, likely  focal to generalized seizure that resulted in laceration to tongue that resulted in laceration to tongue. MRI of brain unremarkable. EEG normal.  Continue Keppra 1000 mg po BID. No additional work up is needed at this time. Patient can follow up with Dr. Joseline Abreu in the outpatient clinic after discharge. Seizure precautions discussed and patient verbalized understanding. Patient verbalized understanding that he is not to drive until seizure free for 6 months or cleared by a neurologist.      Plan:     Continue Keppra 1000 mg po BID. Seizure Precautions - discussed with patient    Please obey the following seizure precautions:  1. No driving until seizure free for 6 months, or cleared by a neurologist.  2. No tub baths, take showers instead. 3. No swimming unless there is someone immediately next to you  4. No working at heights, on roofs, climbing ladders, etc.  5. Use the back burns when cooking  6. Avoid open machinery  7. Avoid open flames unless someone is immediately nearby  8. In general avoid situations where someone could be injured in the event of a seizure. Subjective: Interval history:    Patient is in bed. No seizures over night. Tolerating Keppra. MRI of brain unremarkable. History:    Slade Casanova is a 59 y.o. male who is being seen for seizure. Review of systems negative with exception of pertinent positives and negatives noted above.        Objective:     Vitals:    04/18/20 1918 04/18/20 2334 04/19/20 0443 04/19/20 0729   BP: 154/70 132/77 132/79 124/75   Pulse: 60 (!) 50 (!) 56 (!) 56   Resp: 18 17 17 17   Temp: 98.6 °F (37 °C) 97.5 °F (36.4 °C) 97.6 °F (36.4 °C) 97.7 °F (36.5 °C)   SpO2: 97% 99% 99% 99%   Weight:       Height:              Current Facility-Administered Medications:     sodium chloride (NS) flush 5-40 mL, 5-40 mL, IntraVENous, Q8H, Golden Puga MD, 10 mL at 04/19/20 0532    sodium chloride (NS) flush 5-40 mL, 5-40 mL, IntraVENous, PRN, Vikram Alvarez MD    acetaminophen (TYLENOL) tablet 650 mg, 650 mg, Oral, Q6H PRN, Vikram Alvarez MD, 650 mg at 04/19/20 0813    HYDROcodone-acetaminophen (NORCO) 5-325 mg per tablet 1 Tab, 1 Tab, Oral, Q4H PRN, Vikram Alvarez MD    naloxone (NARCAN) injection 0.4 mg, 0.4 mg, IntraVENous, PRN, Vikram Alvarez MD    ondansetron (ZOFRAN) injection 4 mg, 4 mg, IntraVENous, Q4H PRN, Vikram Alvarez MD    amLODIPine (NORVASC) tablet 10 mg, 10 mg, Oral, DAILY, Alanis Alvarez MD, 10 mg at 04/19/20 0813    levothyroxine (SYNTHROID) tablet 50 mcg, 50 mcg, Oral, ACB, Golden Puga MD, 50 mcg at 04/19/20 0531    rosuvastatin (CRESTOR) tablet 40 mg, 40 mg, Oral, QHS, Alanis Alvarez MD, 40 mg at 04/18/20 2028    hydrALAZINE (APRESOLINE) 20 mg/mL injection 10 mg, 10 mg, IntraVENous, Q6H PRN, Vikram Alvarez MD    LORazepam (ATIVAN) injection 1 mg, 1 mg, IntraVENous, Q4H PRN, Vikram Alvarez MD    levETIRAcetam (KEPPRA) tablet 1,000 mg, 1,000 mg, Oral, Q12H, Alanis Alvarez MD, 1,000 mg at 04/19/20 0813    No results found for this or any previous visit (from the past 12 hour(s)). MRI Results (most recent):  Results from [place] encounter on 04/17/20   MRI BRAIN W WO CONT    Narrative EXAMINATION: BRAIN MRI 4/18/2020 11:26 AM    ACCESSION NUMBER: [id number]    INDICATION: seizure protocol, seizure    COMPARISON: Head CT 4/17/2020    TECHNIQUE: Multiplanar multisequence MRI of the brain without and with  intravenous administration of 19 cc Dotarem contrast agent. FINDINGS:    Midline structures including the corpus callosum, pituitary gland, optic nerves,  and cerebellum are well developed. The ventricles are within normal limits for the mild degree of global brain  parenchymal volume loss.  There is no midline shift. The basilar cisterns are  patent. There is no cerebellar tonsillar ectopia or herniation. There are a few nonspecific punctate T2 hyperintensities scattered throughout  the periventricular and subcortical white matter. Diffusion imaging shows no evidence of acute infarction or other acute  abnormality. The expected large intracranial vascular flow voids are preserved. There is no  evidence of intracranial blood products. There is no abnormal intra or extra-axial postcontrast enhancement. The hippocampi demonstrate normal morphology and signal characteristics  bilaterally. There are no suspicious osseous lesions. Impression IMPRESSION:    1. No evidence of acute infarction. 2. Mild burden of chronic microangiopathy. VOICE DICTATED BY: Dr. Shea Javier             Physical Exam:  General - Well developed, well nourished, in no apparent distress. Pleasant and conversent. HEENT - Normocephalic, atraumatic. Conjunctiva are clear. Neck - Supple without masses  Cardiovascular - Regular rate and rhythm. Normal S1, S2 without murmurs, rubs, or gallops. Lungs - Clear to auscultation. Abdomen - Soft, nontender with normal bowel sounds. Extremities - Peripheral pulses intact. No edema and no rashes. Neurological examination - Comprehension, attention, memory and reasoning are intact. Language and speech are normal.   On cranial nerve examination, (II, III, IV, VI) pupils are equal, round, and reactive to light. Visual acuity is adequate. Visual fields are full to finger confrontation. Extraocular motility is normal. (V, VII) Face is symmetric and sensation is intact to light touch. (VIII) Hearing is intact. (IX, X) Palate and uvula elevate symmetrically. Voice is normal. (XI) Shoulder shrug is strong and equal bilaterally. (XII)Tongue is midline. Motor examination - There is normal muscle tone and bulk. Power is full throughout.  Muscle stretch reflexes are normoactive and there are no pathological reflexes present. Plantar response is flexor. Sensation is intact to light touch, pinprick, vibration and proprioception in all extremities.  Cerebellar examination is normal. Gait and stance are normal.       Signed By: Jadon Brown NP     April 19, 2020

## 2020-04-19 NOTE — PROCEDURES
[facility] Neurology   Routine Electroencephalogram Report      DATE: April 19, 2020 time Start: 1017 time End: 1039    EEG Number:     Indication: Seizure    Medications:   Current Facility-Administered Medications   Medication Dose Route Frequency Provider Last Rate Last Dose    sodium chloride (NS) flush 5-40 mL  5-40 mL IntraVENous Q8H Bradly Moreno MD   10 mL at 04/19/20 0532    sodium chloride (NS) flush 5-40 mL  5-40 mL IntraVENous PRN Roma Alvarez MD        acetaminophen (TYLENOL) tablet 650 mg  650 mg Oral Q6H PRN Roma Alvarez MD   650 mg at 04/19/20 0813    HYDROcodone-acetaminophen (NORCO) 5-325 mg per tablet 1 Tab  1 Tab Oral Q4H PRN Roma Alvarez MD        naloxone (NARCAN) injection 0.4 mg  0.4 mg IntraVENous PRN Roma Alvarez MD        ondansetron (ZOFRAN) injection 4 mg  4 mg IntraVENous Q4H PRN Roma Alvarez MD        amLODIPine (NORVASC) tablet 10 mg  10 mg Oral DAILY Roma Alvarez MD   10 mg at 04/19/20 0813    levothyroxine (SYNTHROID) tablet 50 mcg  50 mcg Oral ACB Bradly Moreno MD   50 mcg at 04/19/20 0531    rosuvastatin (CRESTOR) tablet 40 mg  40 mg Oral QHS Alanis Alvarez MD   40 mg at 04/18/20 2028    hydrALAZINE (APRESOLINE) 20 mg/mL injection 10 mg  10 mg IntraVENous Q6H PRN Roma Alvarez MD        LORazepam (ATIVAN) injection 1 mg  1 mg IntraVENous Q4H PRN Roma Alvarez MD        levETIRAcetam (KEPPRA) tablet 1,000 mg  1,000 mg Oral Q12H Roma Alvarez MD   1,000 mg at 04/19/20 0813       Technique: The EEG was recorded on a 32 channel [employer] digital EEG machine. A full electrode headset was applied in accordance with the International 10-20 System of Electrode Placement. All impedances are less than 5000 Ohms.  Time locked digital video of the patient was recorded and reviewed as needed for clinical correlation     State of Consciousness: awake, drowsy and asleep Description:  Waking EEG is very well-organized demonstrating nearly continuous 8.5-9 Hz 40-60 µV rhythmic activity symmetrically in the posterior head regions bilaterally. Reactivity to eye opening and eye closure is good. During drowsiness and light sleep diffuse mixed frequency semirhythmic 5-7 Hz slowing is seen symmetrically. No focal slowing or epileptiform activity is seen    Activation Procedures:  Hyperventilation: Was not performed  Photic Stimulation: Did not alter the record      Interpretation: Normal electroencephalogram, awake, asleep and with activation procedures. There are no focal lateralizing or epileptiform features.

## 2020-04-19 NOTE — PROGRESS NOTES
Patient admitted after midnight, seen afternoon after admission. Presented with report of witnessed new onset seizure. No further seizures to date. Aaox3. Slightly drowsy. cvs rrr s1 s2 no mrg, lungs cta b/l, abd soft nt/ nd ext no edema    Neurology consulted - recommending continuation of Keppra, prn ativan, EEG (pending), MRI brain ( no acute cva or mass). Anticipate discharge ? in AM pending EEG completion.

## 2020-04-19 NOTE — DISCHARGE INSTRUCTIONS
Patient Education        Seizure: Care Instructions  Your Care Instructions    Seizures are caused by abnormal patterns of electrical signals in the brain. They are different for each person. Seizures can affect movement, speech, vision, or awareness. Some people have only slight shaking of a hand and do not pass out. Other people may pass out and have violent shaking of the whole body. Some people appear to stare into space. They are awake, but they can't respond normally. Later, they may not remember what happened. You may need tests to identify the type and cause of the seizures. A seizure may occur only once, or you may have them more than one time. Taking medicines as directed and following up with your doctor may help keep you from having more seizures. The doctor has checked you carefully, but problems can develop later. If you notice any problems or new symptoms, get medical treatment right away. Follow-up care is a key part of your treatment and safety. Be sure to make and go to all appointments, and call your doctor if you are having problems. It's also a good idea to know your test results and keep a list of the medicines you take. How can you care for yourself at home? · Be safe with medicines. Take your medicines exactly as prescribed. Call your doctor if you think you are having a problem with your medicine. · Do not do any activity that could be dangerous to you or others until your doctor says it is safe to do so. For example, do not drive a car, operate machinery, swim, or climb ladders. · Be sure that anyone treating you for any health problem knows that you have had a seizure and what medicines you are taking for it. · Identify and avoid things that may make you more likely to have a seizure. These may include lack of sleep, alcohol or drug use, stress, or not eating. · Make sure you go to your follow-up appointment. When should you call for help?   Call 911 anytime you think you may need emergency care. For example, call if:    · You have another seizure.     · You have more than one seizure in 24 hours.     · You have new symptoms, such as trouble walking, speaking, or thinking clearly.    Call your doctor now or seek immediate medical care if:    · You are not acting normally.    Watch closely for changes in your health, and be sure to contact your doctor if you have any problems. Where can you learn more? Go to http://anisha-marcos.info/  Enter M769 in the search box to learn more about \"Seizure: Care Instructions. \"  Current as of: November 19, 2019Content Version: 12.4  © 9378-8869 [employer]. Care instructions adapted under license by [employer] (which disclaims liability or warranty for this information). If you have questions about a medical condition or this instruction, always ask your healthcare professional. [address] any warranty or liability for your use of this information.

## 2020-04-19 NOTE — PROGRESS NOTES
Problem: Falls - Risk of  Goal: *Absence of Falls  Description: Document Alexa Gomes Fall Risk and appropriate interventions in the flowsheet.   Outcome: Progressing Towards Goal  Note: Fall Risk Interventions:  Mobility Interventions: Communicate number of staff needed for ambulation/transfer, Patient to call before getting OOB    Mentation Interventions: Adequate sleep, hydration, pain control, Door open when patient unattended, Evaluate medications/consider consulting pharmacy, Update white board, More frequent rounding    Medication Interventions: Evaluate medications/consider consulting pharmacy, Patient to call before getting OOB, Teach patient to arise slowly    Elimination Interventions: Call light in reach, Patient to call for help with toileting needs, Toileting schedule/hourly rounds, Urinal in reach    History of Falls Interventions: Consult care management for discharge planning, Door open when patient unattended, Evaluate medications/consider consulting pharmacy    Problem: Seizure Disorder (Adult)  Goal: *STG: Remains free of seizure activity  Outcome: Progressing Towards Goal  Goal: *STG: Maintains lab values within therapeutic range  Outcome: Progressing Towards Goal  Goal: *STG/LTG: Complies with medication therapy  Outcome: Progressing Towards Goal  Goal: *STG: Remains free of injury during seizure activity  Outcome: Progressing Towards Goal  Goal: *STG: Remains safe in hospital  Outcome: Progressing Towards Goal  Goal: Interventions  Outcome: Progressing Towards Goal     Problem: Pain  Goal: *Control of Pain  Outcome: Progressing Towards Goal

## 2020-08-24 PROBLEM — S62.616A CLOSED DISPLACED FRACTURE OF PROXIMAL PHALANX OF RIGHT LITTLE FINGER: Status: ACTIVE | Noted: 2017-11-10

## 2020-08-24 PROBLEM — I10 ESSENTIAL HYPERTENSION: Status: ACTIVE | Noted: 2019-02-06

## 2020-08-24 PROBLEM — E78.00 HYPERCHOLESTEROLEMIA: Status: ACTIVE | Noted: 2017-07-17

## 2020-08-24 PROBLEM — J20.9 ACUTE BRONCHITIS: Status: ACTIVE | Noted: 2019-01-02

## 2021-01-18 ENCOUNTER — HOSPITAL ENCOUNTER (OUTPATIENT)
Dept: LAB | Age: 66
Discharge: HOME | End: 2021-01-18
Payer: MEDICARE

## 2021-01-18 DIAGNOSIS — R56.9 SEIZURE (HCC): ICD-10-CM

## 2021-01-18 PROBLEM — N52.1 ERECTILE DYSFUNCTION DUE TO DISEASES CLASSIFIED ELSEWHERE: Status: ACTIVE | Noted: 2020-10-12

## 2021-01-18 PROBLEM — E11.59 DIABETES MELLITUS WITH CIRCULATORY DISORDER CAUSING ERECTILE DYSFUNCTION (HCC): Status: ACTIVE | Noted: 2020-12-01

## 2021-01-18 PROBLEM — N52.1 DIABETES MELLITUS WITH CIRCULATORY DISORDER CAUSING ERECTILE DYSFUNCTION (HCC): Status: ACTIVE | Noted: 2020-12-01

## 2021-01-18 LAB
ANION GAP SERPL CALC-SCNC: 3 MMOL/L (ref 7–16)
BUN SERPL-MCNC: 14 MG/DL (ref 8–23)
CALCIUM SERPL-MCNC: 8.9 MG/DL (ref 8.3–10.4)
CHLORIDE SERPL-SCNC: 108 MMOL/L (ref 98–107)
CO2 SERPL-SCNC: 33 MMOL/L (ref 21–32)
CREAT SERPL-MCNC: 0.93 MG/DL (ref 0.8–1.5)
GLUCOSE SERPL-MCNC: 138 MG/DL (ref 65–100)
POTASSIUM SERPL-SCNC: 3.7 MMOL/L (ref 3.5–5.1)
SODIUM SERPL-SCNC: 144 MMOL/L (ref 136–145)

## 2021-01-18 PROCEDURE — 36415 COLL VENOUS BLD VENIPUNCTURE: CPT

## 2021-01-18 PROCEDURE — 80048 BASIC METABOLIC PNL TOTAL CA: CPT

## 2023-01-28 NOTE — ROUTINE PROCESS
EEG Completed Chintan Rivera EEG Tech PAST SURGICAL HISTORY:  H/O abdominal hysterectomy     H/O  section     History of cholecystectomy